# Patient Record
Sex: FEMALE | Race: WHITE | NOT HISPANIC OR LATINO | URBAN - METROPOLITAN AREA
[De-identification: names, ages, dates, MRNs, and addresses within clinical notes are randomized per-mention and may not be internally consistent; named-entity substitution may affect disease eponyms.]

---

## 2024-08-14 ENCOUNTER — PHARMACY VISIT (OUTPATIENT)
Dept: PHARMACY | Facility: MEDICAL CENTER | Age: 49
End: 2024-08-14
Payer: COMMERCIAL

## 2024-08-14 ENCOUNTER — HOSPITAL ENCOUNTER (EMERGENCY)
Facility: MEDICAL CENTER | Age: 49
End: 2024-08-14
Attending: EMERGENCY MEDICINE
Payer: COMMERCIAL

## 2024-08-14 VITALS
RESPIRATION RATE: 16 BRPM | OXYGEN SATURATION: 99 % | WEIGHT: 135.36 LBS | BODY MASS INDEX: 21.25 KG/M2 | HEIGHT: 67 IN | HEART RATE: 99 BPM | DIASTOLIC BLOOD PRESSURE: 74 MMHG | SYSTOLIC BLOOD PRESSURE: 111 MMHG | TEMPERATURE: 98.4 F

## 2024-08-14 DIAGNOSIS — S91.311A LACERATION OF RIGHT FOOT, INITIAL ENCOUNTER: ICD-10-CM

## 2024-08-14 PROCEDURE — RXMED WILLOW AMBULATORY MEDICATION CHARGE: Performed by: EMERGENCY MEDICINE

## 2024-08-14 PROCEDURE — 700102 HCHG RX REV CODE 250 W/ 637 OVERRIDE(OP): Performed by: EMERGENCY MEDICINE

## 2024-08-14 PROCEDURE — 90471 IMMUNIZATION ADMIN: CPT

## 2024-08-14 PROCEDURE — A9270 NON-COVERED ITEM OR SERVICE: HCPCS | Performed by: EMERGENCY MEDICINE

## 2024-08-14 PROCEDURE — 90715 TDAP VACCINE 7 YRS/> IM: CPT | Performed by: EMERGENCY MEDICINE

## 2024-08-14 PROCEDURE — 303747 HCHG EXTRA SUTURE

## 2024-08-14 PROCEDURE — 99283 EMERGENCY DEPT VISIT LOW MDM: CPT

## 2024-08-14 PROCEDURE — 304999 HCHG REPAIR-SIMPLE/INTERMED LEVEL 1

## 2024-08-14 PROCEDURE — 700111 HCHG RX REV CODE 636 W/ 250 OVERRIDE (IP): Performed by: EMERGENCY MEDICINE

## 2024-08-14 PROCEDURE — 700101 HCHG RX REV CODE 250: Performed by: EMERGENCY MEDICINE

## 2024-08-14 PROCEDURE — 304217 HCHG IRRIGATION SYSTEM

## 2024-08-14 RX ORDER — LIDOCAINE HYDROCHLORIDE AND EPINEPHRINE BITARTRATE 20; .01 MG/ML; MG/ML
10 INJECTION, SOLUTION SUBCUTANEOUS ONCE
Status: COMPLETED | OUTPATIENT
Start: 2024-08-14 | End: 2024-08-14

## 2024-08-14 RX ORDER — CEPHALEXIN 500 MG/1
1000 CAPSULE ORAL ONCE
Status: COMPLETED | OUTPATIENT
Start: 2024-08-14 | End: 2024-08-14

## 2024-08-14 RX ORDER — CEPHALEXIN 500 MG/1
500 CAPSULE ORAL 4 TIMES DAILY
Qty: 20 CAPSULE | Refills: 0 | Status: ACTIVE | OUTPATIENT
Start: 2024-08-14 | End: 2024-08-19

## 2024-08-14 RX ADMIN — CEPHALEXIN 1000 MG: 500 CAPSULE ORAL at 06:32

## 2024-08-14 RX ADMIN — LIDOCAINE HYDROCHLORIDE,EPINEPHRINE BITARTRATE 10 ML: 20; .01 INJECTION, SOLUTION INFILTRATION; PERINEURAL at 06:51

## 2024-08-14 RX ADMIN — CLOSTRIDIUM TETANI TOXOID ANTIGEN (FORMALDEHYDE INACTIVATED), CORYNEBACTERIUM DIPHTHERIAE TOXOID ANTIGEN (FORMALDEHYDE INACTIVATED), BORDETELLA PERTUSSIS TOXOID ANTIGEN (GLUTARALDEHYDE INACTIVATED), BORDETELLA PERTUSSIS FILAMENTOUS HEMAGGLUTININ ANTIGEN (FORMALDEHYDE INACTIVATED), BORDETELLA PERTUSSIS PERTACTIN ANTIGEN, AND BORDETELLA PERTUSSIS FIMBRIAE 2/3 ANTIGEN 0.5 ML: 5; 2; 2.5; 5; 3; 5 INJECTION, SUSPENSION INTRAMUSCULAR at 07:06

## 2024-08-14 ASSESSMENT — PAIN DESCRIPTION - PAIN TYPE: TYPE: ACUTE PAIN

## 2024-08-14 NOTE — ED PROVIDER NOTES
CHIEF COMPLAINT  Chief Complaint   Patient presents with    Laceration       LIMITATION TO HISTORY   None    HPI    Anai Neri is a 48 y.o. female whose tetanus is unknown  Who has a history of alcohol use and requesting antibiotics that would not prevent her from drinking  Who comes in today with a laceration that is on her right foot.  Duration since 3 PM  It was associate with throbbing pain which woke her up  No associated numbness or tingling distally  No associated fevers or chills  Patient states that she went to go look at some horses.  She believes she cut either on some brush or something as she was wearing flip-flops.    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  They apparently copiously irrigated with some contact solution which had some peroxide in it.  They have been keeping it clean.    OUTSIDE HISTORIAN(S):  Significant other is at the bedside.  He states that he kept it clean and irrigated out.    EXTERNAL RECORDS REVIEWED  No previous notes noted    REVIEW OF SYSTEMS  Note see above    PAST MEDICAL HISTORY  Past Medical History:   Diagnosis Date    Cervical cancer (HCC)        FAMILY HISTORY  No family history on file.    SOCIAL HISTORY  Social History     Tobacco Use    Smoking status: Never    Smokeless tobacco: Never   Vaping Use    Vaping status: Every Day    Substances: Nicotine   Substance Use Topics    Alcohol use: Yes     Comment: bottle of champagne daily    Drug use: Never     Social History     Substance and Sexual Activity   Drug Use Never       SURGICAL HISTORY  No past surgical history on file.    CURRENT MEDICATIONS  No current facility-administered medications for this encounter.  No current outpatient medications  "on file.    ALLERGIES  No Known Allergies    PHYSICAL EXAM  VITAL SIGNS: /85   Pulse (!) 106   Temp 36.3 °C (97.3 °F) (Temporal)   Resp 20   Ht 1.702 m (5' 7\")   Wt 61.4 kg (135 lb 5.8 oz)   SpO2 100%   BMI 21.20 kg/m²   Reviewed and tachycardia noted  Constitutional: Well developed, Well nourished, no acute distress.  HENT: Normocephalic, atraumatic, bilateral external ears normal, No intraoral erythema, edema, exudate  Eyes: PERRLA, conjunctiva pink, no scleral icterus.   Cardiovascular: Less than 2-second capillary refill in the toes.  Extremities warm to touch pedal pulse noted    Skin: Serration on the dorsum of the foot is appreciated.  Patient has small abrasions next to it.  It is oozing.    Musculoskeletal: Patient can move her toes without deformity.  She can flex and extend her toes.  Neurologic: Sensation light touch on all her toes      MEDICAL DECISION MAKING:  PROBLEMS EVALUATED THIS VISIT:  Laceration.  This happened at 3 PM.  Patient is approximately 16 hours out.  Tetanus is not up-to-date.  Patient is here neurovascular intact.    Decision making.  Delayed wound closure.  Patient has kept it clean with a copious irrigation.  I think the patient can be sutured.  We had a discussion regarding risks including infection and put the patient on antibiotics that is been more than several hours.         PLAN:  Suture repair  Antibiotics  Tetanus    RISK:  Patient is at moderate risk and therefore should get antibiotic prescription as the patient has a duration of approximately 15 to 16 hours.  In addition uncertain how dirty this wound was.          ED COURSE:    ED Observation Status? No   No noted need for observation for developing issue    INTERVENTIONS BY ME:  Medications   tetanus-dipth-acell pertussis (Adacel) inj 0.5 mL (has no administration in time range)   cephALEXin (Keflex) capsule 1,000 mg (1,000 mg Oral Given 8/14/24 0632)   lidocaine-epinephrine 2 %-1:136137 injection 10 mL " (10 mL Injection Given 8/14/24 0651)     Laceration Repair Procedure Note    Indication: Laceration    Procedure: The patient was placed in the appropriate position and anesthesia around the laceration was obtained by infiltration using 2% Lidocaine with epinephrine. The area was then irrigated with normal saline. The wound was explored and no foreign body/bodies was/were found. The laceration was closed with 3-0 Ethilon using interrupted sutures. There were no additional lacerations requiring repair. The wound area was then dressed with bacitracin and a sterile dressing.      Total repaired wound length: 4 cm.     Other Items: Suture count: 7    The patient tolerated the procedure well.    Complications: None            FINAL DISPO PLAN   New Prescriptions    CEPHALEXIN (KEFLEX) 500 MG CAP    Take 1 Capsule by mouth 4 times a day for 5 days.         Followup:  [facility], Emergency Dept  [address] [place] [id number] [phone]  Go to   If symptoms worsen      CONDITION: Improved.     FINAL IMPRESSION  1. Laceration of right foot, initial encounter    4 cm with simple repair

## 2024-08-14 NOTE — ED TRIAGE NOTES
"Chief Complaint   Patient presents with    Laceration     Pt arrives with complaint of laceration to right foot since 1500 yesterday. Pt unsure of what she slipped on, unknown tetanus shot. Bleeding controlled. Daily drinker, last drink within the last hr. Pt aox4, skin warm and dry, airway patent, rr even and unlabored, ambulatory, PMS intact to RLE.     /85   Pulse (!) 106   Temp 36.3 °C (97.3 °F) (Temporal)   Resp 20   Ht 1.702 m (5' 7\")   Wt 61.4 kg (135 lb 5.8 oz)   SpO2 100%   BMI 21.20 kg/m²     Pt updated on rooming process and to notify staff for worsening condition. Pt sent back to lobby until a room is available.    "

## 2024-08-14 NOTE — ED NOTES
Dressing done.    Pt discharged to home. Discharge paperwork provided. Education provided by ERP. Reinforced discharge instructions.  Pt was given follow up instructions and prescriptions.  Pt verbalized understanding of all instructions for discharge.   Patient went out of the ER ambulatory with steady gait with significant other., alert and oriented x 4, with all belongings.